# Patient Record
Sex: MALE | Race: WHITE | NOT HISPANIC OR LATINO | Employment: FULL TIME | URBAN - METROPOLITAN AREA
[De-identification: names, ages, dates, MRNs, and addresses within clinical notes are randomized per-mention and may not be internally consistent; named-entity substitution may affect disease eponyms.]

---

## 2018-08-22 ENCOUNTER — OFFICE VISIT (OUTPATIENT)
Dept: NEUROLOGY | Facility: CLINIC | Age: 33
End: 2018-08-22

## 2018-08-22 VITALS
HEART RATE: 84 BPM | BODY MASS INDEX: 33.43 KG/M2 | SYSTOLIC BLOOD PRESSURE: 126 MMHG | DIASTOLIC BLOOD PRESSURE: 78 MMHG | OXYGEN SATURATION: 97 % | HEIGHT: 67 IN | WEIGHT: 213 LBS

## 2018-08-22 DIAGNOSIS — G43.709 CHRONIC MIGRAINE WITHOUT AURA WITHOUT STATUS MIGRAINOSUS, NOT INTRACTABLE: ICD-10-CM

## 2018-08-22 DIAGNOSIS — R56.9 GENERALIZED CONVULSIVE SEIZURES (HCC): Primary | ICD-10-CM

## 2018-08-22 PROBLEM — F41.9 ANXIETY: Status: ACTIVE | Noted: 2018-08-22

## 2018-08-22 PROBLEM — F32.A DEPRESSION: Status: ACTIVE | Noted: 2018-08-22

## 2018-08-22 PROBLEM — G43.909 MIGRAINE WITHOUT STATUS MIGRAINOSUS, NOT INTRACTABLE: Status: ACTIVE | Noted: 2018-08-22

## 2018-08-22 PROCEDURE — 99204 OFFICE O/P NEW MOD 45 MIN: CPT | Performed by: PSYCHIATRY & NEUROLOGY

## 2018-08-22 RX ORDER — VENLAFAXINE 75 MG/1
75 TABLET ORAL 2 TIMES DAILY
COMMUNITY
End: 2019-05-02

## 2018-08-22 RX ORDER — TOPIRAMATE 25 MG/1
TABLET ORAL
Qty: 42 TABLET | Refills: 0 | Status: SHIPPED | OUTPATIENT
Start: 2018-08-22 | End: 2019-05-02

## 2018-08-22 RX ORDER — TOPIRAMATE 50 MG/1
50 TABLET, FILM COATED ORAL 2 TIMES DAILY
Qty: 60 TABLET | Refills: 5 | Status: SHIPPED | OUTPATIENT
Start: 2018-08-22 | End: 2018-10-01 | Stop reason: SDUPTHER

## 2018-08-22 RX ORDER — SUMATRIPTAN 100 MG/1
TABLET, FILM COATED ORAL
Qty: 8 TABLET | Refills: 2 | Status: SHIPPED | OUTPATIENT
Start: 2018-08-22 | End: 2019-05-02

## 2018-08-22 NOTE — PROGRESS NOTES
Subjective:    CC: Jus Maguire is seen today in consultation at the request of SD Abernathy for Seizures       HPI:  32-year-old male accompanied by his wife with a history of anxiety, depression and migraine headaches now presents for his seizures.  As per patient he had one of his typical migraine headaches about 10 days ago.  He took a local brand Excedrin migraine tablet (one that he has taken several times before).  After that he started to throw up because of his severe headache and had 2 such episodes.  Patient says he did not feel right after that hence his wife decided to take him to the hospital.  While in the car patient's entire body stiffened followed by whole body shaking that lasted for less than 1 minute without tongue bite or bowel/bladder incontinence.  He was drooling from the mouth and making grunting noises.  He had 2 more episodes within 1 minute of each other.  He was taken to the [facility] where he had a CT head and MRI of the sinuses that was normal.  His blood pressure on arrival was 70/30.  He was also given a medication cocktail for his headaches but nothing for the seizures.  Patient denies having any such episodes in the past.  He also has no family history of seizures, febrile seizures or head injuries.  With regards to his headache he tells me he has been having migraine headaches for about 3 years.  They are once a week mainly on the right side of his head behind his eye sometimes radiating to the back, throbbing in nature with photophobia, phonophobia , nausea and vomiting.  He typically takes over-the-counter medications for these headaches.  He started taking Effexor for depression about 4 months ago.    The following portions of the patient's history were reviewed today and updated as of 08/22/2018  : allergies, current medications, past family history, past medical history, past social history, past surgical history and problem list  These document will be scanned  to patient's chart.      Current Outpatient Prescriptions:   •  venlafaxine (EFFEXOR) 75 MG tablet, Take 75 mg by mouth 2 (Two) Times a Day., Disp: , Rfl:   •  SUMAtriptan (IMITREX) 100 MG tablet, Take one tablet at onset of headache.  May repeat once after 2 hours.  Do not take more than 2 tabs a day or 8 tablets a month, Disp: 8 tablet, Rfl: 2  •  topiramate (TOPAMAX) 25 MG tablet, Take 1 tablet at night for 1 week then one tablet twice a day for 1 week then 1 tablet in the morning and 2 tablets at night for 1 week, Disp: 42 tablet, Rfl: 0  •  topiramate (TOPAMAX) 50 MG tablet, Take 1 tablet by mouth 2 (Two) Times a Day., Disp: 60 tablet, Rfl: 5   Past Medical History:   Diagnosis Date   • Anxiety    • Depression       History reviewed. No pertinent surgical history.   Family History   Problem Relation Age of Onset   • Diabetes Mother    • Hypertension Mother    • Mental illness Mother    • Hypertension Father    • Mental illness Sister    • Mental illness Brother    • Hypertension Maternal Grandmother    • Hypertension Maternal Grandfather    • Diabetes Maternal Grandfather       Social History     Social History   • Marital status:      Spouse name: N/A   • Number of children: N/A   • Years of education: N/A     Occupational History   • Not on file.     Social History Main Topics   • Smoking status: Never Smoker   • Smokeless tobacco: Never Used   • Alcohol use No   • Drug use: No   • Sexual activity: Not on file     Other Topics Concern   • Not on file     Social History Narrative   • No narrative on file     Review of Systems   Constitutional: Negative.    HENT: Negative.    Eyes: Negative.    Respiratory: Negative.    Cardiovascular: Negative.    Gastrointestinal: Negative.    Endocrine: Negative.    Genitourinary: Negative.    Musculoskeletal:        As noted in my HPI.    Skin: Negative.    Allergic/Immunologic: Negative.    Neurological: Positive for dizziness, seizures and headache.        As noted  "in my HPI.    Hematological: Negative.    Psychiatric/Behavioral: Negative.  Positive for depressed mood.       Objective:    /78 (BP Location: Right arm, Patient Position: Sitting, Cuff Size: Adult)   Pulse 84   Ht 170.2 cm (67\")   Wt 96.6 kg (213 lb)   SpO2 97%   BMI 33.36 kg/m²     Neurology Exam:    General apperance: NAD.     Mental status: Alert, awake and oriented to time place and person.    Recent and Remote memory: Intact.    Attention span and Concentration: Normal.     Language and Speech: Intact- No dysarthria.    Fluency, Naming , Repitition and Comprehension:  Intact    Cranial Nerves:   CN II: Visual fields are full. Intact. Fundi - Normal, No papillederma, Pupils - DERECK  CN III, IV and VI: Extraocular movements are intact. Normal saccades.   CN V: Facial sensation is intact.   CN VII: Muscles of facial expression reveal no asymmetry. Intact.   CN VIII: Hearing is intact. Whispered voice intact.   CN IX and X: Palate elevates symmetrically. Intact  CN XI: Shoulder shrug is intact.   CN XII: Tongue is midline without evidence of atrophy or fasciculation.     Motor:  Right UE muscle strength 5/5. Normal tone.     Left UE muscle strength 5/5. Normal tone.      Right LE muscle strength5/5. Normal tone.     Left LE muscle strength 5/5. Normal tone.      Sensory: Normal light touch, vibration and pinprick sensation bilaterally.    DTRs: 2+ bilaterally in upper and lower extremities.    Babinski: Negative bilaterally.    Co-ordination: Normal finger-to-nose, heel to shin B/L.    Rhomberg: Negative.    Gait: Normal.    Cardiovascular: Regular rate and rhythm without murmur, gallop or rub.    Assessment and Plan:  1. Generalized convulsive seizures (CMS/HCC)  His generalized tonic-clonic seizures could have been due to severe hypotension and possible electrolyte abnormalities.  I will get an MRI brain as well as an EEG.  Will start him on Topamax that will work for both his migraines and " seizures  - MRI Brain Without Contrast; Future  - EEG; Future    2. Chronic migraine without aura without status migrainosus, not intractable  I will start him on Topamax gradually increasing to 50 mg twice a day for headache prophylaxis.  I have made him aware of the side effects and asked him  to keep himself well hydrated.  I have also told him to take Imitrex 100 mg at the onset of his migraine headaches.       Return in about 6 weeks (around 10/3/2018).         Tomeka Lyons MD

## 2018-09-12 ENCOUNTER — HOSPITAL ENCOUNTER (OUTPATIENT)
Dept: NEUROLOGY | Facility: HOSPITAL | Age: 33
Discharge: HOME | End: 2018-09-12
Attending: PSYCHIATRY & NEUROLOGY | Admitting: PSYCHIATRY & NEUROLOGY

## 2018-09-12 ENCOUNTER — HOSPITAL ENCOUNTER (OUTPATIENT)
Dept: MRI IMAGING | Facility: HOSPITAL | Age: 33
Discharge: HOME | End: 2018-09-12
Attending: PSYCHIATRY & NEUROLOGY

## 2018-09-12 DIAGNOSIS — R56.9 GENERALIZED CONVULSIVE SEIZURES (HCC): ICD-10-CM

## 2018-09-12 PROCEDURE — 70551 MRI BRAIN STEM W/O DYE: CPT

## 2018-09-12 PROCEDURE — 95816 EEG AWAKE AND DROWSY: CPT | Performed by: PSYCHIATRY & NEUROLOGY

## 2018-09-12 PROCEDURE — 95813 EEG EXTND MNTR 61-119 MIN: CPT

## 2018-09-13 ENCOUNTER — TELEPHONE (OUTPATIENT)
Dept: NEUROLOGY | Facility: CLINIC | Age: 33
End: 2018-09-13

## 2018-09-13 NOTE — TELEPHONE ENCOUNTER
----- Message from Tomeka Lyons MD sent at 9/13/2018  9:29 AM EDT -----  Can you tell him both his MRI and his EEG were normal

## 2018-09-17 ENCOUNTER — TELEPHONE (OUTPATIENT)
Dept: NEUROLOGY | Facility: CLINIC | Age: 33
End: 2018-09-17

## 2018-10-01 ENCOUNTER — OFFICE VISIT (OUTPATIENT)
Dept: NEUROLOGY | Facility: CLINIC | Age: 33
End: 2018-10-01

## 2018-10-01 VITALS
WEIGHT: 213 LBS | HEIGHT: 67 IN | SYSTOLIC BLOOD PRESSURE: 122 MMHG | HEART RATE: 91 BPM | DIASTOLIC BLOOD PRESSURE: 74 MMHG | OXYGEN SATURATION: 97 % | BODY MASS INDEX: 33.43 KG/M2

## 2018-10-01 DIAGNOSIS — R56.9 GENERALIZED CONVULSIVE SEIZURES (HCC): Primary | ICD-10-CM

## 2018-10-01 DIAGNOSIS — G43.009 MIGRAINE WITHOUT AURA AND WITHOUT STATUS MIGRAINOSUS, NOT INTRACTABLE: ICD-10-CM

## 2018-10-01 PROCEDURE — 99212 OFFICE O/P EST SF 10 MIN: CPT | Performed by: PSYCHIATRY & NEUROLOGY

## 2018-10-01 RX ORDER — TOPIRAMATE 50 MG/1
50 TABLET, FILM COATED ORAL 2 TIMES DAILY
Qty: 60 TABLET | Refills: 5 | Status: SHIPPED | OUTPATIENT
Start: 2018-10-01 | End: 2019-05-02

## 2018-10-01 NOTE — PROGRESS NOTES
Subjective:    CC: Jus Maguire is seen today  for Seizures and headaches       HPI:  Current visit-since his last visit he has not had any more seizures with his last seizure being in the first week of August.  Both his MRI brain and EEG were normal.  His headaches have also improved and he gets only about one headache every other week now.  He did start the Topamax however increase the dose to only 25 mg twice a day.  Has not tried Imitrex yet.    Initial [id number]-old male accompanied by his wife with a history of anxiety, depression and migraine headaches now presents for his seizures.  As per patient he had one of his typical migraine headaches about 10 days ago.  He took a local brand Excedrin migraine tablet (one that he has taken several times before).  After that he started to throw up because of his severe headache and had 2 such episodes.  Patient says he did not feel right after that hence his wife decided to take him to the hospital.  While in the car patient's entire body stiffened followed by whole body shaking that lasted for less than 1 minute without tongue bite or bowel/bladder incontinence.  He was drooling from the mouth and making grunting noises.  He had 2 more episodes within 1 minute of each other.  He was taken to the [facility] where he had a CT head and MRI of the sinuses that was normal.  His blood pressure on arrival was 70/30.  He was also given a medication cocktail for his headaches but nothing for the seizures.  Patient denies having any such episodes in the past.  He also has no family history of seizures, febrile seizures or head injuries.  With regards to his headache he tells me he has been having migraine headaches for about 3 years.  They are once a week mainly on the right side of his head behind his eye sometimes radiating to the back, throbbing in nature with photophobia, phonophobia , nausea and vomiting.  He typically takes over-the-counter medications for these  headaches.  He started taking Effexor for depression about 4 months ago.    The following portions of the patient's history were reviewed today and updated as of 10/01/2018  : allergies, current medications, past family history, past medical history, past social history, past surgical history and problem list  These document will be scanned to patient's chart.      Current Outpatient Prescriptions:   •  SUMAtriptan (IMITREX) 100 MG tablet, Take one tablet at onset of headache.  May repeat once after 2 hours.  Do not take more than 2 tabs a day or 8 tablets a month, Disp: 8 tablet, Rfl: 2  •  topiramate (TOPAMAX) 25 MG tablet, Take 1 tablet at night for 1 week then one tablet twice a day for 1 week then 1 tablet in the morning and 2 tablets at night for 1 week, Disp: 42 tablet, Rfl: 0  •  topiramate (TOPAMAX) 50 MG tablet, Take 1 tablet by mouth 2 (Two) Times a Day., Disp: 60 tablet, Rfl: 5  •  venlafaxine (EFFEXOR) 75 MG tablet, Take 75 mg by mouth 2 (Two) Times a Day., Disp: , Rfl:    Past Medical History:   Diagnosis Date   • Anxiety    • Depression       History reviewed. No pertinent surgical history.   Family History   Problem Relation Age of Onset   • Diabetes Mother    • Hypertension Mother    • Mental illness Mother    • Hypertension Father    • Mental illness Sister    • Mental illness Brother    • Hypertension Maternal Grandmother    • Hypertension Maternal Grandfather    • Diabetes Maternal Grandfather       Social History     Social History   • Marital status:      Spouse name: N/A   • Number of children: N/A   • Years of education: N/A     Occupational History   • Not on file.     Social History Main Topics   • Smoking status: Never Smoker   • Smokeless tobacco: Never Used   • Alcohol use No   • Drug use: No   • Sexual activity: Not on file     Other Topics Concern   • Not on file     Social History Narrative   • No narrative on file     Review of Systems   Neurological: Positive for seizures.  "  All other systems reviewed and are negative.      Objective:    /74 (BP Location: Right arm, Patient Position: Sitting, Cuff Size: Adult)   Pulse 91   Ht 170.2 cm (67\")   Wt 96.6 kg (213 lb)   SpO2 97%   BMI 33.36 kg/m²     Neurology Exam:    General apperance: NAD.     Mental status: Alert, awake and oriented to time place and person.    Recent and Remote memory: Intact.    Attention span and Concentration: Normal.     Language and Speech: Intact- No dysarthria.    Fluency, Naming , Repitition and Comprehension:  Intact    Cranial Nerves:   CN II: Visual fields are full. Intact. Fundi - Normal, No papillederma, Pupils - DEERCK  CN III, IV and VI: Extraocular movements are intact. Normal saccades.   CN V: Facial sensation is intact.   CN VII: Muscles of facial expression reveal no asymmetry. Intact.   CN VIII: Hearing is intact. Whispered voice intact.   CN IX and X: Palate elevates symmetrically. Intact  CN XI: Shoulder shrug is intact.   CN XII: Tongue is midline without evidence of atrophy or fasciculation.     Motor:  Right UE muscle strength 5/5. Normal tone.     Left UE muscle strength 5/5. Normal tone.      Right LE muscle strength5/5. Normal tone.     Left LE muscle strength 5/5. Normal tone.      Sensory: Normal light touch, vibration and pinprick sensation bilaterally.    DTRs: 2+ bilaterally in upper and lower extremities.    Babinski: Negative bilaterally.    Co-ordination: Normal finger-to-nose, heel to shin B/L.    Rhomberg: Negative.    Gait: Normal.    Cardiovascular: Regular rate and rhythm without murmur, gallop or rub.    Assessment and Plan:  1. Generalized convulsive seizures (CMS/HCC)  No seizures since last visit.  Both MRI brain and EEG were normal.  I counseled him again on seizure precautions including no driving for 3 months from his last seizure    2. Migraine without aura and without status migrainosus, not intractable  His headaches have improved.  I have asked him to " increase his dose of Topamax gradually to 50 mg twice a day.  I  made him aware of the side effects and asked him to keep himself well hydrated.       Return in about 3 months (around 1/1/2019).         Tomeka Lyons MD

## 2019-05-02 ENCOUNTER — CONSULT (OUTPATIENT)
Dept: CARDIOLOGY | Facility: CLINIC | Age: 34
End: 2019-05-02

## 2019-05-02 ENCOUNTER — HOSPITAL ENCOUNTER (OUTPATIENT)
Dept: CARDIOLOGY | Facility: HOSPITAL | Age: 34
Discharge: HOME | End: 2019-05-02
Admitting: PHYSICIAN ASSISTANT

## 2019-05-02 VITALS
BODY MASS INDEX: 33.59 KG/M2 | SYSTOLIC BLOOD PRESSURE: 112 MMHG | DIASTOLIC BLOOD PRESSURE: 74 MMHG | HEIGHT: 67 IN | WEIGHT: 214 LBS | HEART RATE: 70 BPM | OXYGEN SATURATION: 98 %

## 2019-05-02 DIAGNOSIS — R55 SYNCOPE AND COLLAPSE: ICD-10-CM

## 2019-05-02 DIAGNOSIS — F41.9 ANXIETY: Primary | ICD-10-CM

## 2019-05-02 LAB
BH CV ECHO MEAS - AO ROOT AREA (BSA CORRECTED): 1.5
BH CV ECHO MEAS - AO ROOT AREA: 7.3 CM^2
BH CV ECHO MEAS - AO ROOT DIAM: 3.1 CM
BH CV ECHO MEAS - BSA(HAYCOCK): 2.2 M^2
BH CV ECHO MEAS - BSA: 2.1 M^2
BH CV ECHO MEAS - BZI_BMI: 33.5 KILOGRAMS/M^2
BH CV ECHO MEAS - BZI_METRIC_HEIGHT: 170.2 CM
BH CV ECHO MEAS - BZI_METRIC_WEIGHT: 97.1 KG
BH CV ECHO MEAS - EDV(CUBED): 72.1 ML
BH CV ECHO MEAS - EDV(MOD-SP4): 106 ML
BH CV ECHO MEAS - EDV(TEICH): 77 ML
BH CV ECHO MEAS - EF(CUBED): 71.6 %
BH CV ECHO MEAS - EF(MOD-BP): 60 %
BH CV ECHO MEAS - EF(MOD-SP4): 56.6 %
BH CV ECHO MEAS - EF(TEICH): 63.7 %
BH CV ECHO MEAS - ESV(CUBED): 20.5 ML
BH CV ECHO MEAS - ESV(MOD-SP4): 46 ML
BH CV ECHO MEAS - ESV(TEICH): 27.9 ML
BH CV ECHO MEAS - FS: 34.3 %
BH CV ECHO MEAS - IVS/LVPW: 0.98
BH CV ECHO MEAS - IVSD: 0.8 CM
BH CV ECHO MEAS - LA DIMENSION: 3.3 CM
BH CV ECHO MEAS - LA/AO: 1.1
BH CV ECHO MEAS - LAD MAJOR: 5.1 CM
BH CV ECHO MEAS - LAT PEAK E' VEL: 13.8 CM/SEC
BH CV ECHO MEAS - LATERAL E/E' RATIO: 6.1
BH CV ECHO MEAS - LV DIASTOLIC VOL/BSA (35-75): 50.9 ML/M^2
BH CV ECHO MEAS - LV MASS(C)D: 101.8 GRAMS
BH CV ECHO MEAS - LV MASS(C)DI: 48.9 GRAMS/M^2
BH CV ECHO MEAS - LV MAX PG: 4.3 MMHG
BH CV ECHO MEAS - LV MEAN PG: 2.5 MMHG
BH CV ECHO MEAS - LV SYSTOLIC VOL/BSA (12-30): 22.1 ML/M^2
BH CV ECHO MEAS - LV V1 MAX: 104.1 CM/SEC
BH CV ECHO MEAS - LV V1 MEAN: 73.8 CM/SEC
BH CV ECHO MEAS - LV V1 VTI: 25.1 CM
BH CV ECHO MEAS - LVIDD: 4.2 CM
BH CV ECHO MEAS - LVIDS: 2.7 CM
BH CV ECHO MEAS - LVLD AP4: 8.6 CM
BH CV ECHO MEAS - LVLS AP4: 7.1 CM
BH CV ECHO MEAS - LVOT AREA (M): 3.5 CM^2
BH CV ECHO MEAS - LVOT AREA: 3.4 CM^2
BH CV ECHO MEAS - LVOT DIAM: 2.1 CM
BH CV ECHO MEAS - LVPWD: 0.82 CM
BH CV ECHO MEAS - MED PEAK E' VEL: 9.1 CM/SEC
BH CV ECHO MEAS - MEDIAL E/E' RATIO: 9.3
BH CV ECHO MEAS - MV A MAX VEL: 61.7 CM/SEC
BH CV ECHO MEAS - MV DEC SLOPE: 432.7 CM/SEC^2
BH CV ECHO MEAS - MV E MAX VEL: 85.9 CM/SEC
BH CV ECHO MEAS - MV E/A: 1.4
BH CV ECHO MEAS - SI(CUBED): 24.8 ML/M^2
BH CV ECHO MEAS - SI(LVOT): 41.2 ML/M^2
BH CV ECHO MEAS - SI(MOD-SP4): 28.8 ML/M^2
BH CV ECHO MEAS - SI(TEICH): 23.6 ML/M^2
BH CV ECHO MEAS - SV(CUBED): 51.7 ML
BH CV ECHO MEAS - SV(LVOT): 85.8 ML
BH CV ECHO MEAS - SV(MOD-SP4): 60 ML
BH CV ECHO MEAS - SV(TEICH): 49 ML
BH CV ECHO MEAS - TAPSE (>1.6): 3 CM2
BH CV ECHO MEASUREMENTS AVERAGE E/E' RATIO: 7.5
BH CV VAS BP LEFT ARM: NORMAL MMHG
BH CV XLRA - RV BASE: 4.5 CM
BH CV XLRA - RV LENGTH: 6.6 CM
BH CV XLRA - RV MID: 3.2 CM
LEFT ATRIUM VOLUME INDEX: 29.8 ML/M^2
LEFT ATRIUM VOLUME: 62 ML
LV EF 2D ECHO EST: 60 %

## 2019-05-02 PROCEDURE — 99243 OFF/OP CNSLTJ NEW/EST LOW 30: CPT | Performed by: PHYSICIAN ASSISTANT

## 2019-05-02 PROCEDURE — 93306 TTE W/DOPPLER COMPLETE: CPT

## 2019-05-02 PROCEDURE — 93306 TTE W/DOPPLER COMPLETE: CPT | Performed by: INTERNAL MEDICINE

## 2019-05-02 PROCEDURE — 93000 ELECTROCARDIOGRAM COMPLETE: CPT | Performed by: PHYSICIAN ASSISTANT

## 2019-05-02 RX ORDER — SERTRALINE HYDROCHLORIDE 100 MG/1
100 TABLET, FILM COATED ORAL DAILY
COMMUNITY

## 2019-05-02 NOTE — PROGRESS NOTES
[facility]  INITIAL OFFICE CONSULT      Jus Maguire  1985  PCP: Mallory Gu APRN    SUBJECTIVE:   Jus Maguire is a 33 y.o. male seen for a consultation visit regarding the following:     Chief Complaint:   Chief Complaint   Patient presents with   • Chest Pain   • Dizziness   • pain in legs   • nausea or vomiting          Consultation is requested by SD Abernathy for evaluation of near syncpe      History:  Mr. Maguire is a 33-year-old gentleman presents today for evaluation regarding 2 syncope events that occurred 8 months apart.  He reports his only previous cardiac work-up was in 2016 he had some atypical chest pain had a stress test that has raised hospital that was acceptable.  He states about 8 months ago he was at home when he had an event associate with severe headache which eventually led to nausea.  He then started to feel funny all over his body diaphoretic hot and flushed feeling and then he had a syncope event after vomiting.  He was taken to [facility] and states that when he arrived his blood pressure was low in the 60s systolic.  He received IV fluids his symptoms resolved and he was discharged that same day.  He states all his lab work was acceptable with exception of elevated lactic acid level.  Prior to this event he had no symptoms of chest pain or palpitations.  Recently proximally month ago he had a similar event where he states he went to work as usual and he had a day where he does not feel quite right.  He felt fatigued weak he began to feel diaphoretic.  At home he felt so tired he went to go rest and lay down.  During this time he had some again hot and flushed feeling sweaty feeling nauseous he got up to go get a drink of water and on the way back from getting a drink he began to again feel weak dizzy and he passed out again.  He try to get up after he passed out and walk again and again had another event.  He was  taken to [facility] ER.  He received IV fluids with improvement of symptoms he was discharged home.  He has since had a work-up with neurology which has ruled out the possibility seizure disorder.  He does have significant migraine headaches and has been altering medications for this.  He is also recently started Zoloft therapy 100 mg for the past couple months regarding history of depression anxiety.  He states since that event happened he has had no further recurrent events.  He is trying to cut out caffeine and he was drinking up to 6 Alea8's per day he is cut this down to just 1 today.  He denies any chest pain exertion suggesting angina pectoris.  He always has some musculoskeletal discomfort in his chest that is vague dull sensation that is not worsened  physical activity.  He presents today for further cardiac evaluation.  He denies any history of palpitations or heart failure symptoms.      Cardiac PMH: (Old records have been reviewed and summarized below)        Past Medical History, Past Surgical History, Family history, Social History, and Medications were all reviewed with the patient today and updated as necessary.     Current Outpatient Medications   Medication Sig Dispense Refill   • sertraline (ZOLOFT) 100 MG tablet Take 100 mg by mouth Daily.       No current facility-administered medications for this visit.      Allergies   Allergen Reactions   • Penicillins GI Intolerance         Past Medical History:   Diagnosis Date   • Anxiety    • Depression      History reviewed. No pertinent surgical history.  Family History   Problem Relation Age of Onset   • Diabetes Mother    • Hypertension Mother    • Mental illness Mother    • Hypertension Father    • Mental illness Sister    • Mental illness Brother    • Hypertension Maternal Grandmother    • Hypertension Maternal Grandfather    • Diabetes Maternal Grandfather      Social History     Tobacco Use   • Smoking status: Never Smoker   • Smokeless  "tobacco: Never Used   Substance Use Topics   • Alcohol use: No       ROS:  Review of Symptoms:  General: no recent weight loss/gain, weakness or fatigue  Skin: no rashes, lumps, or other skin changes  HEENT: + Syncope events  Respiratory: no cough or hemoptysis  Cardiovascular: no palpitations, and tachycardia  Gastrointestinal: no black/tarry stools or diarrhea  Urinary: no change in frequency or urgency  Peripheral Vascular: no claudication or leg cramps  Musculoskeletal: no muscle or joint pain/stiffness  Psychiatric: no depression or excessive stress  Neurological: no sensory or motor loss, no syncope  Hematologic: no anemia, easy bruising or bleeding  Endocrine: no thyroid problems, nor heat or cold intolerance         PHYSICAL EXAM:   /74 (BP Location: Left arm, Patient Position: Sitting)   Pulse 70   Ht 170.2 cm (67\")   Wt 97.1 kg (214 lb)   SpO2 98%   BMI 33.52 kg/m²      Wt Readings from Last 5 Encounters:   05/02/19 97.1 kg (214 lb)   10/01/18 96.6 kg (213 lb)   09/12/18 97.5 kg (215 lb)   08/22/18 96.6 kg (213 lb)     BP Readings from Last 5 Encounters:   05/02/19 112/74   10/01/18 122/74   08/22/18 126/78       General-Well Nourished, Well developed  Eyes - PERRLA  Neck- supple, No mass  CV- regular rate and rhythm, no MRG  Lung- clear bilaterally  Abd- soft, +BS  Musc/skel - Norm strength and range of motion  Skin- warm and dry  Neuro - Alert & Oriented x 3, appropriate mood.    Medical problems and test results were reviewed with the patient today.     No results found for this or any previous visit.      No results found for: CHOL, HDL, HDLC, LDL, LDLC, VLDL    EKG:  (EKG/Tracing has been independently visualized by me and summarized below)      ECG 12 Lead  Date/Time: 5/2/2019 11:33 AM  Performed by: Carlitos Teixeira PA  Authorized by: Carlitos Teixeira PA   Comparison: not compared with previous ECG   Rhythm: sinus rhythm  Rate: normal  Conduction: conduction normal  ST Segments: ST " "segments normal  QRS axis: normal  Other: no other findings    Clinical impression: normal ECG            ASSESSMENT   1. Syncope, Neurocardiogenic symptoms:  Reduce Caffeine, increase hydration  2. Caffeine use: 6-7 soft drinks per day, Now reduced to once a day.   3. Migraine headaches, Followed by Neurology.  Patient stated Seizures   \"ruled out\" per Neurology.   4. Depression/Anxiety: Currently on Zoloft  5. Atypical chest Pain:  Negative Stress test 2016 T [facility].     PLAN  · Discussed in detail with the patient his symptoms suggest possible neurocardiogenic syncope in the setting of dehydration.  He states he was drinking a large amount of caffeine and not eating healthy during this time.  We would like him to eliminate his caffeinated beverages increase hydration fluids Gatorade and Powerade.  Also asked him to continue to monitor his home blood pressure and heart rate increase his salt intake as he states his blood pressure sometimes is marginal.  · Pursue an echocardiogram through out any structural heart disease.  He has had a normal stress test 2 years ago really notes of ischemia.  · We will defer tilt table study at this time unless he has recurrent events.  We discussed this option with the patient would like to defer as well which I do with conservative management with trying to reduce caffeine and increase hydration.  · Return for follow-up in approximate 6 weeks or sooner if any change in symptoms.            Cardiology/Electrophysiology  05/02/19  11:24 AM  Will Puneet SCOTT  "

## 2019-06-06 ENCOUNTER — OFFICE VISIT (OUTPATIENT)
Dept: CARDIOLOGY | Facility: CLINIC | Age: 34
End: 2019-06-06

## 2019-06-06 VITALS
HEART RATE: 68 BPM | BODY MASS INDEX: 33.43 KG/M2 | DIASTOLIC BLOOD PRESSURE: 78 MMHG | OXYGEN SATURATION: 95 % | HEIGHT: 67 IN | SYSTOLIC BLOOD PRESSURE: 130 MMHG | WEIGHT: 213 LBS

## 2019-06-06 DIAGNOSIS — R55 NEUROCARDIOGENIC SYNCOPE: Primary | ICD-10-CM

## 2019-06-06 PROCEDURE — 99213 OFFICE O/P EST LOW 20 MIN: CPT | Performed by: PHYSICIAN ASSISTANT

## 2019-06-06 NOTE — PROGRESS NOTES
[facility] at [facility]   OFFICE NOTE      Jus Maguire  1985  PCP: Mallory Gu APRN    SUBJECTIVE:   Jus Maguire is a 33 y.o. male seen for a follow up visit regarding the following:     CC:NCS    HPI:   The patient is a 33-year-old gentleman who returns for follow-up regarding history of symptoms suggesting neurocardiac syncope.  Last year he had 2 events occurring about 8 months apart.  These are in the setting of a significant amount of caffeine usage.  He has now cut his caffeine way back.  He is trying to eat a regular diet and drinking more fluids hydration.  He has had no recurrent events since making these changes.  He states his blood pressures well controlled in the 130 systolic range.  He denies any recurrent migraine headaches seizures.  He denies any chest pain.  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  He had an echocardiogram after last visit that revealed normal LV function.  Overall he feels event was related to too much caffeine usage he is cut way back and is not having symptoms.    ROS:  Review of Symptoms:  General: no recent weight loss/gain, weakness or fatigue  Skin: no rashes, lumps, or other skin changes  HEENT: no dizziness, lightheadedness, or vision changes  Respiratory: no cough or hemoptysis  Cardiovascular: no palpitations, and tachycardia  Gastrointestinal: no black/tarry stools or diarrhea  Urinary: no change in frequency or urgency  Peripheral Vascular: no claudication or leg cramps  Musculoskeletal: no muscle or joint pain/stiffness  Psychiatric: no depression or excessive stress  Neurological: no sensory or motor loss, no syncope  Hematologic: no anemia, easy bruising or bleeding  Endocrine: no thyroid problems, nor heat or cold intolerance    Cardiac PMH: (Old records have been reviewed and summarized below)  1. NCS  2. Atypical chest  "pain   A)Negative Stress test 2017 [facility]   B)[place] Echo 5/19  3.Depression/Anxiety  4. Seizure disorder  5. Migraine Headaches     Past Medical History, Past Surgical History, Family history, Social History, and Medications were all reviewed with the patient today and updated as necessary.       Current Outpatient Medications:   •  sertraline (ZOLOFT) 100 MG tablet, Take 100 mg by mouth Daily., Disp: , Rfl:       Allergies   Allergen Reactions   • Penicillins GI Intolerance     Patient Active Problem List   Diagnosis   • Migraine without status migrainosus, not intractable   • Anxiety   • Generalized convulsive seizures (CMS/HCC)   • Depression   • Neurocardiogenic syncope     Past Medical History:   Diagnosis Date   • Anxiety    • Depression      History reviewed. No pertinent surgical history.  Family History   Problem Relation Age of Onset   • Diabetes Mother    • Hypertension Mother    • Mental illness Mother    • Hypertension Father    • Mental illness Sister    • Mental illness Brother    • Hypertension Maternal Grandmother    • Hypertension Maternal Grandfather    • Diabetes Maternal Grandfather      Social History     Tobacco Use   • Smoking status: Never Smoker   • Smokeless tobacco: Never Used   Substance Use Topics   • Alcohol use: No         PHYSICAL EXAM:    /78 (BP Location: Left arm, Patient Position: Sitting)   Pulse 68   Ht 170.2 cm (67\")   Wt 96.6 kg (213 lb)   SpO2 95%   BMI 33.36 kg/m²        Wt Readings from Last 5 Encounters:   06/06/19 96.6 kg (213 lb)   05/02/19 97.1 kg (214 lb)   10/01/18 96.6 kg (213 lb)   09/12/18 97.5 kg (215 lb)   08/22/18 96.6 kg (213 lb)       BP Readings from Last 5 Encounters:   06/06/19 130/78   05/02/19 112/74   10/01/18 122/74   08/22/18 126/78       General appearance - Alert, well appearing, and in no distress   Mental status - Affect appropriate to mood.  Eyes - Sclerae anicteric,  ENMT - Hearing grossly normal bilaterally, Dental hygiene " good.  Neck - Carotids upstroke normal bilaterally, no bruits, no JVD.  Resp - Clear to auscultation, no wheezes, rales or rhonchi, symmetric air entry.  Heart - Normal rate, regular rhythm, normal S1, S2, no murmurs, rubs, clicks or gallops.  GI - Soft, nontender, nondistended, no masses or organomegaly.  Neurological - Grossly intact - normal speech, no focal findings  Musculoskeletal - No joint tenderness, deformity or swelling, no muscular tenderness noted.  Extremities - Peripheral pulses normal, no pedal edema, no clubbing or cyanosis.  Skin - Normal coloration and turgor.  Psych -  oriented to person, place, and time.    Medical problems and test results were reviewed with the patient today.     No results found for this or any previous visit (from the past 672 hour(s)).    ASSESSMENT   1. NCS  2. Atypical chest pain, Negative Stress test 2017  3. Echo 5/19 Normal EF    PLAN  · Patient had no recurrent events suggesting his diet and routine.  He had a normal echocardiogram and normal LV function.  He is exercising a regular basis without any symptoms.  He like to continue the course.  He is taking a trip on a cruise with caution about becoming dehydrated in the hot sun he will try to increase his fluid intake.  · Return for follow-up one year or sooner as needed.  If continues to have recurrent events we could consider a tilt table study to further elicit neurocardiogenic diagnosis but for now he would like to try conservative treatment.    6/6/2019  11:07 AM    Will Puneet TRINH

## 2019-10-09 ENCOUNTER — TELEPHONE (OUTPATIENT)
Dept: CARDIOLOGY | Facility: CLINIC | Age: 34
End: 2019-10-09

## 2019-10-09 NOTE — TELEPHONE ENCOUNTER
Patient called and left a message. I tried to call him back at both numbers. No answer. It would not give me the option to leave a message.